# Patient Record
Sex: FEMALE
[De-identification: names, ages, dates, MRNs, and addresses within clinical notes are randomized per-mention and may not be internally consistent; named-entity substitution may affect disease eponyms.]

---

## 2017-10-01 NOTE — ED PDOC
Arrival/HPI





- General


Chief Complaint: Abdominal Pain


Time Seen by Provider: 10/01/17 08:47


Historian: Patient





- History of Present Illness


Narrative History of Present Illness (Text): 


10/01/17 08:57


A 56 year old female pmh enteritis, diabetes mellitus, hypertension, and 

depression, surgical history includes bladder sling, spinal disc repair, hernia 

dorsal spine repair, carpal tunnel repair, presents to the emergency department 

for constant lower abdominal "burning" discomfort, which began 3 days ago. She 

notes she has associated mild nausea. The patient reports she has not taken 

anything for the pain at home. She denies any fever, chest pain, vomiting, 

diarrhea, dysuria, vaginal discharge, vaginal bleeding, or any other complaints 

at this time. 











Time/Duration: < week (x 3 days)


Symptom Onset: Gradual


Symptom Course: Unchanged


Quality: Burning (lower abdominal )


Activities at Onset: Light


Context: Home





Past Medical History





- Provider Review


Nursing Documentation Reviewed: Yes





- Past History


Past History: Non-Contributing





- Infectious Disease


Hx of Infectious Diseases: None





- Tetanus Immunization


Tetanus Immunization: Unknown





- Reproductive


Menopause: Yes





- Cardiac


Hx Cardiac Disorders: Yes


Hx Hypertension: Yes





- Pulmonary


Hx Respiratory Disorders: No





- Neurological


Hx Neurological Disorder: No





- HEENT


Hx HEENT Disorder: No





- Renal


Hx Renal Disorder: No





- Endocrine/Metabolic


Hx Endocrine Disorders: Yes


Hx Diabetes Mellitus Type 2: Yes


Hx Hyperthyroidism: Yes





- Hematological/Oncological


Hx Blood Disorders: No





- Integumentary


Hx Dermatological Disorder: No





- Musculoskeletal/Rheumatological


Hx Musculoskeletal Disorders: No





- Gastrointestinal


Hx Gastrointestinal Disorders: No





- Genitourinary/Gynecological


Hx Genitourinary Disorders: No





- Psychiatric


Hx Psychophysiologic Disorder: Yes


Hx Depression: Yes


Hx Substance Use: No





- Past Surgical History


Past Surgical History: No Previous





- Surgical History


Hx Orthopedic Surgery: Yes (carpal tunnel, herniated disc)





- Anesthesia


Hx Anesthesia: Yes


Hx Anesthesia Reactions: No


Hx Malignant Hyperthermia: No





- Suicidal Assessment


Feels Threatened In Home Enviroment: No





Family/Social History





- Physician Review


Nursing Documentation Reviewed: Yes


Family/Social History: Unknown Family HX


Smoking Status: Never Smoked


Hx Alcohol Use: No


Hx Substance Use: No


Hx Substance Use Treatment: No





Allergies/Home Meds


Allergies/Adverse Reactions: 


Allergies





No Known Allergies Allergy (Verified 10/01/17 08:54)


 








Home Medications: 


 Home Meds











 Medication  Instructions  Recorded  Confirmed


 


ARIPiprazole [Abilify] 2 mg PO DAILY 10/01/17 10/01/17


 


Escitalopram [Lexapro] 10 mg PO DAILY 10/01/17 10/01/17


 


GlipiZIDE [Glipizide] 10 mg PO BID 10/01/17 10/01/17


 


Lisinopril [Zestril] 40 mg PO DAILY 10/01/17 10/01/17


 


MetFORMIN [glucOPHAGE] 1,000 mg PO BID 10/01/17 10/01/17


 


amLODIPine [Norvasc] 5 mg PO DAILY 10/01/17 10/01/17














Review of Systems





- Physician Review


All systems were reviewed & negative as marked: Yes





- Review of Systems


Constitutional: absent: Fevers


Respiratory: absent: SOB, Cough


Cardiovascular: absent: Chest Pain


Gastrointestinal: Nausea (mild), Other (lower abdominal burning ).  absent: 

Abdominal Pain, Diarrhea, Vomiting


Genitourinary Female: absent: Dysuria, Frequency, Hematuria, Vaginal Bleeding, 

Vaginal Discharge





Physical Exam


Vital Signs Reviewed: Yes


Vital Signs











  Temp Pulse Resp BP Pulse Ox


 


 10/01/17 12:00   87  18  159/71 H  99


 


 10/01/17 10:33   89  18  157/79 H  98


 


 10/01/17 08:51  98.6 F  70  16  164/90 H  99











Temperature: Afebrile


Blood Pressure: Hypertensive


Pulse: Regular


Respiratory Rate: Normal


Appearance: Positive for: Well-Appearing, Non-Toxic, Comfortable


Pain Distress: None


Mental Status: Positive for: Alert and Oriented X 3


Finger Stick Blood Glucose: 147





- Systems Exam


Head: Present: Atraumatic, Normocephalic


Pupils: Present: PERRL


Mouth: Present: Moist Mucous Membranes


Neck: Present: Normal Range of Motion


Respiratory/Chest: Present: Clear to Auscultation, Good Air Exchange.  No: 

Respiratory Distress, Accessory Muscle Use


Cardiovascular: Present: Regular Rate and Rhythm, Normal S1, S2.  No: Murmurs


Abdomen: Present: Tenderness (non-focal lower abdominal tenderness), Normal 

Bowel Sounds.  No: Distention, Peritoneal Signs, McBurney's Point Tender


Neurological: Present: GCS=15, Speech Normal


Skin: Present: Warm, Dry, Normal Color.  No: Rashes


Psychiatric: Present: Alert, Oriented x 3, Normal Insight, Normal Concentration





Medical Decision Making


ED Course and Treatment: 





The pt appears well, in no distress, benign abdominal exam. I disc w both pt 

and her  results and plan for f/u for pain and also for incidental CT 

findings. Pt v/u and agrees w plan. Will f/u w pcp and gyn this week.





Progress Notes:


EKG:


Ordered, reviewed, and independently interpreted the EKG.


Rate :  73 BPM


Rhythm : NSR


Interpretation : No axis, normal intervals. No acute ischemia. 








PROCEDURE:  CT Abdomen and Pelvis with contrast





HISTORY:


abdominal pain





COMPARISON:


None.





TECHNIQUE:


Contrast dose: 100 mL Omnipaque 350





Radiation dose:





Total exam DLP = 511.44 mGy-cm.





This CT exam was performed using one or more of the following dose reduction 

techniques: Automated exposure control, adjustment of the mA and/or kV 

according to patient size, and/or use of iterative reconstruction technique.





FINDINGS:





LOWER THORAX:


Unremarkable. 





LIVER:


Unremarkable. No gross lesion or ductal dilatation. 





GALLBLADDER AND BILE DUCTS:


Unremarkable. 





PANCREAS:


The pancreas is normal in size.  There is a fluid density mass with direct 

communication demonstrated with the pancreatic duct. This mass measures 

approximately 1.2 x 0.6 cm. This most likely represents an IPMN.  Followup with 

contrast-enhanced CT is advised.  There is no other pancreatic mass identified.

  There is no pancreatic ductal dilatation.





SPLEEN:


Unremarkable. 





ADRENALS:


Unremarkable. No mass. 





KIDNEYS AND URETERS:


Unremarkable. No hydronephrosis. No solid mass. 





VASCULATURE:


Unremarkable. No aortic aneurysm. 





BOWEL:


Unremarkable. No obstruction. No gross mural thickening. 





APPENDIX:


Normal appendix. 





PERITONEUM:


Unremarkable. No free fluid. No free air. 





LYMPH NODES:


Unremarkable. No enlarged lymph nodes. 





BLADDER:


Unremarkable. 





REPRODUCTIVE:


Postmenopausal uterus.  Several coarse calcifications likely reflecting 

degenerating calcified fibroids. 





BONES:


No acute fracture. 





OTHER FINDINGS:


None.





IMPRESSION:


No acute abnormality. Fluid density 12 mm pancreatic mass with direct 

communication with the pancreatic duct most likely represents an IPMN.  

Followup with contrast-enhanced CT is advised.














- Lab Interpretations


Microbiology Results: 


Microbiology Results





10/01/17 09:30   Urine   Urine Culture - Final


                            No Growth (<1,000 CFU/ML)








Lab Results: 








 10/01/17 08:55 





 10/01/17 08:55 





 Lab Results





10/01/17 09:30: Urine Color Yellow, Urine Appearance Clear, Urine pH 7.0, Ur 

Specific Gravity 1.015, Urine Protein Trace H, Urine Glucose (UA) Negative, 

Urine Ketones Negative, Urine Blood Trace-intact H, Urine Nitrate Negative, 

Urine Bilirubin Negative, Urine Urobilinogen 0.2, Ur Leukocyte Esterase Negative

, Urine RBC 0 - 2, Urine WBC 0 - 2, Ur Epithelial Cells 3 - 4, Urine Bacteria 

Few


10/01/17 08:55: Sodium 142, Potassium 4.2, Chloride 103, Carbon Dioxide 27, 

Anion Gap 16, BUN 12, Creatinine 0.6, Est GFR (African Amer) > 60, Est GFR (Non-

Af Amer) > 60, Random Glucose 130 H, Calcium 9.9, Total Bilirubin 1.1, AST 26, 

ALT 39, Alkaline Phosphatase 133 H, Total Protein 8.0, Albumin 4.7, Globulin 3.3

, Albumin/Globulin Ratio 1.4, Lipase 81


10/01/17 08:55: WBC 7.3, RBC 4.53, Hgb 13.4, Hct 39.9, MCV 88.1, MCH 29.6, MCHC 

33.6, RDW 13.1, Plt Count 222, MPV 9.0, Gran % 49.0 L, Lymph % (Auto) 43.4 H, 

Mono % (Auto) 5.7, Eos % (Auto) 1.8, Baso % (Auto) 0.1, Gran # 3.59, Lymph # 3.2

, Mono # 0.4, Eos # 0.1, Baso # 0.01











- RAD Interpretation


Radiology Orders: 








10/01/17 10:00


ABD & PELVIS IV CONTRAST ONLY [CT] Stat 














- Medication Orders


Current Medication Orders: 











Discontinued Medications





Ketorolac Tromethamine (Toradol)  10 mg IVP STAT STA


   Stop: 10/01/17 12:12


   Last Admin: 10/01/17 12:25  Dose: 10 mg





MAR Pain Assessment


 Document     10/01/17 12:25  [facility]  (Rec: 10/01/17 12:26  [id number]


     Pain Reassessment


      Is this a pain reassessment?               No


     Sleep


      Is patient sleeping during reassessment?   No


     Presence of Pain


      Presence of Pain                           Yes


     Pain Scale Used


      Pain Scale Used                            Numeric


IVP Administration


 Document     10/01/17 12:25  [facility]  (Rec: 10/01/17 12:26  [id number]


     Charges for Administration


      # of IVP Administrations                   1














- Scribe Statement


The provider has reviewed the documentation as recorded by the Scribe


Rosibel Zhou





Provider Scribe Attestation:


All medical record entries made by the Scribe were at my direction and 

personally dictated by me. I have reviewed the chart and agree that the record 

accurately reflects my personal performance of the history, physical exam, 

medical decision making, and the department course for this patient. I have 

also personally directed, reviewed, and agree with the discharge instructions 

and disposition.








Disposition/Present on Arrival





- Present on Arrival


Any Indicators Present on Arrival: No


History of DVT/PE: No


History of Uncontrolled Diabetes: No


Urinary Catheter: No


History of Decub. Ulcer: No


History Surgical Site Infection Following: None





- Disposition


Have Diagnosis and Disposition been Completed?: Yes


Diagnosis: 


 Lower abdominal pain





Disposition: HOME/ ROUTINE


Disposition Time: 13:08


Condition: STABLE


Discharge Instructions (ExitCare):  Acute Abdominal Pain (ED)


Print Language: Ukrainian


Additional Instructions: 


Please follow up with your primary doctor and also with your gynecologist this 

week. Please take the naproxen for pain first and only take the tylenol with 

codeine if this is not helping. Return to the ER for any worsening symptoms or 

for any other concerns. 


Prescriptions: 


Acetaminophen with Codeine [Tylenol with Codeine #3 Tablet] 1 each PO Q4H PRN #

6 tablet


 PRN Reason: Pain, Moderate (4-7)


Naproxen [Naprosyn] 500 mg PO Q12H PRN #10 tablet


 PRN Reason: Pain, Moderate (4-7)


Ondansetron ODT [Zofran ODT] 4 mg PO Q4H PRN #10 odt


 PRN Reason: Nausea/Vomiting


Referrals: 


Ariel Weiner MD [Primary Care Provider] - Follow up with primary


Forms:  [email or web address] (Lithuanian)

## 2017-10-01 NOTE — CT
PROCEDURE:  CT Abdomen and Pelvis with contrast



HISTORY:

abdominal pain



COMPARISON:

None.



TECHNIQUE:

Contrast dose: 100 mL Omnipaque 350



Radiation dose:



Total exam DLP = 511.44 mGy-cm.



This CT exam was performed using one or more of the following dose 

reduction techniques: Automated exposure control, adjustment of the 

mA and/or kV according to patient size, and/or use of iterative 

reconstruction technique.



FINDINGS:



LOWER THORAX:

Unremarkable. 



LIVER:

Unremarkable. No gross lesion or ductal dilatation. 



GALLBLADDER AND BILE DUCTS:

Unremarkable. 



PANCREAS:

The pancreas is normal in size.  There is a fluid density mass with 

direct communication demonstrated with the pancreatic duct. This mass 

measures approximately 1.2 x 0.6 cm. This most likely represents an 

IPMN.  Followup with contrast-enhanced CT is advised.  There is no 

other pancreatic mass identified.  There is no pancreatic ductal 

dilatation.



SPLEEN:

Unremarkable. 



ADRENALS:

Unremarkable. No mass. 



KIDNEYS AND URETERS:

Unremarkable. No hydronephrosis. No solid mass. 



VASCULATURE:

Unremarkable. No aortic aneurysm. 



BOWEL:

Unremarkable. No obstruction. No gross mural thickening. 



APPENDIX:

Normal appendix. 



PERITONEUM:

Unremarkable. No free fluid. No free air. 



LYMPH NODES:

Unremarkable. No enlarged lymph nodes. 



BLADDER:

Unremarkable. 



REPRODUCTIVE:

Postmenopausal uterus.  Several coarse calcifications likely 

reflecting degenerating calcified fibroids. 



BONES:

No acute fracture. 



OTHER FINDINGS:

None.



IMPRESSION:

No acute abnormality. Fluid density 12 mm pancreatic mass with direct 

communication with the pancreatic duct most likely represents an 

IPMN.  Followup with contrast-enhanced CT is advised.

## 2017-10-02 NOTE — CARD
--------------- APPROVED REPORT --------------





EKG Measurement

Heart [id number]

HI [id number]

[id number]

[id number]

[id number]



<Conclusion>

Normal sinus rhythm

Normal ECG

No change

## 2017-10-19 NOTE — CARD
--------------- APPROVED REPORT --------------





EKG Measurement

Heart [id number]

SD [id number]

[id number]

[id number]

[id number]



<Conclusion>

Normal sinus rhythm

Normal ECG

## 2017-10-19 NOTE — RAD
HISTORY:

abdominal pain  



COMPARISON:

Portable chest 07/02/2015. 



FINDINGS:



LUNGS:

No active pulmonary disease.



PLEURA:

No significant pleural effusion identified, no pneumothorax apparent.



CARDIOVASCULAR:

Normal.



OSSEOUS STRUCTURES:

No significant abnormalities.



VISUALIZED UPPER ABDOMEN:

Normal.



OTHER FINDINGS:

None.



IMPRESSION:

No interval acute cardiopulmonary disease appreciated.

## 2017-10-19 NOTE — CT
EXAM:

  CT Abdomen and Pelvis With Intravenous Contrast



EXAM DATE/TIME:

  10/19/2017 3:09 PM



CLINICAL HISTORY:

  57 years old, female; Pain; Abdominal pain; Generalized; Prior surgery; 

Surgery date: 6+ months; Surgery type: Bladder surgery (lifted)



TECHNIQUE:

  Axial computed tomography images of the abdomen and pelvis with intravenous 

contrast.  All CT scans at this facility use one or more dose reduction 

techniques, viz.: automated exposure control; ma/kV adjustment per patient size 

(including targeted exams where dose is matched to indication; i.e. head); or 

iterative reconstruction technique.

  Coronal and sagittal reformatted images were created and reviewed.



CONTRAST:

  100 mL of OMNI 350 administered intravenously.



COMPARISON:

  CT - ABD   PELVIS IV CONTRAST ONLY 2017-10-01 10:37



FINDINGS:

  Lower thorax:  Heart size is normal. There is a small hiatal hernia. There is 

minimal atelectasis and scarring at the lung bases.



 ABDOMEN:

  Liver:  There is fatty infiltration of the liver.

  Gallbladder and bile ducts:  unremarkable

  Pancreas:  Pancreas is mildly atrophic. There is a 7 x 6 millimeter low 

attenuation lesion at the junction of the pancreatic body and tail, unchanged 

compared to the prior study. Pancreas is otherwise unremarkable.

  Spleen:  unremarkable

  Adrenals:  unremarkable

  Kidneys and ureters:  unremarkable

  Stomach and bowel:  Stomach is empty. Rotation is normal. The proximal 

jejunal loops are mildly distended with air-fluid levels. There is no small 

bowel obstruction. There is oral contrast in the distal small bowel. Terminal 

ileum is unremarkable. Appendix is unremarkable. There is moderate stool in the 

colon.

  Appendix:  See stomach and bowel



 PELVIS:

  Bladder:  unremarkable

  Reproductive:  Uterine contours are lobular. There are small calcified 

masses/fibroids.  Adnexa are unremarkable.



 ABDOMEN and PELVIS:

  Intraperitoneal space:  There is no free air or free fluid.

  Bones/joints:  There are degenerative changes in the osseus structures.

  Soft tissues:  is a fat-containing umbilical hernia.

  Vasculature:  There are atherosclerotic calcifications in the aorta.

  Lymph nodes:  There is no pathologic adenopathy.



 



IMPRESSION: Fatty liver, no acute solid visceral abnormality; low 

attenuation/cystic pancreatic body lesion unchanged since the prior study; no 

bowel obstruction; no CT findings of appendicitis; fibroid uterus



Additional findings as described above.

## 2017-10-19 NOTE — ED PDOC
Arrival/HPI





<Janine Sanchez - Last Filed: 10/19/17 22:33>





- General


Historian: Patient





- History of Present Illness


Time/Duration: Other (approx 20 days)





<Eva Escamilla - Last Filed: 10/20/17 00:34>





- General


Chief Complaint: Abdominal Pain


Time Seen by Provider: 10/19/17 14:37





- History of Present Illness


Narrative History of Present Illness (Text): 





10/19/17 15:13


57yr old female presents today with continued worsening diffuse abdominal pain, 

greatest in the RLQ. c/o subjective fevers and chills. no n/v/d/c. no cp or 

sob. c/o diffuse bodyaches. pt states she has been taking tylenol for pain 

without improvement. denies urinary symptoms.  no dizziness or weakness. no 

other complaints.  (Eva Escamilla)





Past Medical History





- Provider Review


Nursing Documentation Reviewed: Yes





- Travel History


Have you recently traveled outside [place] w/in the past 3 mons?: No





- Past History


Past History: Non-Contributing





- Infectious Disease


Hx of Infectious Diseases: None





- Tetanus Immunization


Tetanus Immunization: Unknown





- Cardiac


Hx Cardiac Disorders: Yes


Hx Hypertension: Yes





- Pulmonary


Hx Respiratory Disorders: No





- Neurological


Hx Neurological Disorder: No





- HEENT


Hx HEENT Disorder: No





- Renal


Hx Renal Disorder: No





- Endocrine/Metabolic


Hx Endocrine Disorders: Yes


Hx Diabetes Mellitus Type 2: Yes


Hx Hyperthyroidism: Yes





- Hematological/Oncological


Hx Blood Disorders: No





- Integumentary


Hx Dermatological Disorder: No





- Musculoskeletal/Rheumatological


Hx Musculoskeletal Disorders: No





- Gastrointestinal


Hx Gastrointestinal Disorders: No





- Genitourinary/Gynecological


Hx Genitourinary Disorders: No





- Psychiatric


Hx Psychophysiologic Disorder: Yes


Hx Depression: Yes


Hx Substance Use: No





- Past Surgical History


Past Surgical History: No Previous





- Surgical History


Hx Orthopedic Surgery: Yes (carpal tunnel, herniated disc)





- Anesthesia


Hx Anesthesia: Yes


Hx Anesthesia Reactions: No


Hx Malignant Hyperthermia: No





- Suicidal Assessment


Feels Threatened In Home Enviroment: No





<Eva Escamilla - Last Filed: 10/20/17 00:34>





Family/Social History





- Physician Review


Nursing Documentation Reviewed: Yes


Family/Social History: Unknown Family HX


Smoking Status: Never Smoked


Hx Alcohol Use: No


Hx Substance Use: No


Hx Substance Use Treatment: No





<Eva Escamilla - Last Filed: 10/20/17 00:34>





Allergies/Home Meds





<Janine Sanchez - Last Filed: 10/19/17 22:33>





<Eva Escamilla - Last Filed: 10/20/17 00:34>


Allergies/Adverse Reactions: 


Allergies





No Known Allergies Allergy (Verified 10/19/17 14:47)


 








Home Medications: 


 Home Meds











 Medication  Instructions  Recorded  Confirmed


 


ARIPiprazole [Abilify] 2 mg PO DAILY 10/01/17 10/01/17


 


Escitalopram [Lexapro] 10 mg PO DAILY 10/01/17 10/01/17


 


GlipiZIDE [Glipizide] 10 mg PO BID 10/01/17 10/01/17


 


Lisinopril [Zestril] 40 mg PO DAILY 10/01/17 10/01/17


 


MetFORMIN [glucOPHAGE] 1,000 mg PO BID 10/01/17 10/01/17


 


amLODIPine [Norvasc] 5 mg PO DAILY 10/01/17 10/01/17














Review of Systems





- Review of Systems


Constitutional: Fevers.  absent: Fatigue


Respiratory: absent: SOB, Cough


Cardiovascular: absent: Chest Pain, Palpitations


Gastrointestinal: Abdominal Pain.  absent: Constipation, Diarrhea, Nausea, 

Vomiting


Genitourinary Female: absent: Dysuria, Frequency, Hematuria


Musculoskeletal: Other (bodyaches).  absent: Arthralgias, Back Pain, Neck Pain


Skin: absent: Rash, Pruritis


Neurological: absent: Headache, Dizziness


Psychiatric: absent: Anxiety, Depression





<Eva Escamilla - Last Filed: 10/20/17 00:34>





Physical Exam


Vital Signs Reviewed: Yes


Temperature: Afebrile


Blood Pressure: Hypertensive


Pulse: Regular


Respiratory Rate: Normal


Appearance: Positive for: Well-Appearing, Non-Toxic, Uncomfortable


Pain Distress: None


Mental Status: Positive for: Alert and Oriented X 3





- Systems Exam


Head: Present: Atraumatic


Mouth: Present: Moist Mucous Membranes


Neck: Present: Normal Range of Motion


Respiratory/Chest: Present: Clear to Auscultation, Good Air Exchange.  No: 

Respiratory Distress, Accessory Muscle Use


Cardiovascular: Present: Regular Rate and Rhythm


Abdomen: Present: Normal Bowel Sounds, Guarding (voluntary).  No: Tenderness (

diffuse abdominal tenderness), Distention, Peritoneal Signs, Rebound


Genitourinary/Pelvic Exam: Present: Normal External Genitalia


Back: Present: Normal Inspection.  No: CVA Tenderness, Midline Tenderness, 

Paraspinal Tenderness


Upper Extremity: Present: Normal ROM


Lower Extremity: Present: Normal ROM


Neurological: Present: GCS=15, Speech Normal


Skin: Present: Warm, Dry, Normal Color.  No: Rashes


Psychiatric: Present: Alert, Oriented x 3





<Eva Escamilla - Last Filed: 10/20/17 00:34>


Vital Signs











  Temp Pulse Resp BP Pulse Ox


 


 10/19/17 16:32   72  18  152/90 H  100


 


 10/19/17 14:43  98.8 F  72  17  164/88 H  100














Medical Decision Making





<Janine Sanchez - Last Filed: 10/19/17 22:33>





<Eva Escamilla - Last Filed: 10/20/17 00:34>


ED Course and Treatment: 





10/19/17 15:16


57yr old female presents today with diffuse abdominal pain. had ct of 

pancreatic mass on 10/1. now with continued pain. has outpatient ct scheduled 

for next week. 





morphine given for pain


NS iv bolus given





cbc wnl


cmp wnl


lipase wnl


UA;trace blood





10/19/17 16:23


pt reassessment; pt feeling better after medications; vitals stable. 





pt reassessment; pain is returning. pt with luq, rlq tenderness. 





CT abd/pelvis with PO and IV contrast:FINDINGS:


Lower thorax: Heart size is normal. There is a small hiatal hernia. There is 

minimal atelectasis and


scarring at the lung bases.


ABDOMEN:


Liver: There is fatty infiltration of the liver.


Gallbladder and bile ducts: unremarkable


Pancreas: Pancreas is mildly atrophic. There is a 7 x 6 millimeter low 

attenuation lesion at the


junction of the pancreatic body and tail, unchanged compared to the prior 

study. Pancreas is


otherwise unremarkable.


Spleen: unremarkable


Adrenals: unremarkable


Kidneys and ureters: unremarkable


Stomach and bowel: Stomach is empty. Rotation is normal. The proximal jejunal 

loops are mildly


distended with air-fluid levels. There is no small bowel obstruction. There is 

oral contrast in the distal


small bowel. Terminal ileum is unremarkable. Appendix is unremarkable. There is 

moderate stool in


the colon.


Appendix: See stomach and bowel


PELVIS:


Bladder: unremarkable


Reproductive: Uterine contours are lobular. There are small calcified masses/

fibroids. Adnexa are


unremarkable.


ABDOMEN and PELVIS:


Intraperitoneal space: There is no free air or free fluid.


Bones/joints: There are degenerative changes in the osseus structures.


Soft tissues: is a fat-containing umbilical hernia.


Vasculature: There are atherosclerotic calcifications in the aorta.


Lymph nodes: There is no pathologic adenopathy.


IMPRESSION: Fatty liver, no acute solid visceral abnormality; low attenuation/

cystic pancreatic body


lesion unchanged since the prior study; no bowel obstruction; no CT findings of 

appendicitis; fibroid


uterus


Additional findings as described above.











case discussed with dr. chavez accepts admission to med/surg 





all aspects of this case were discussed the attending of record. 





impression; intractable abdominal pain, pancreatic mass


admit to med/surg


 (Eva Escamilla)





- Lab Interpretations


Lab Results: 








 10/19/17 14:40 





 10/19/17 14:40 





 Lab Results





10/19/17 18:45: Urine Color Yellow, Urine Appearance Clear, Urine pH 7.0, Ur 

Specific Gravity 1.010, Urine Protein Negative, Urine Glucose (UA) Negative, 

Urine Ketones Negative, Urine Blood Trace-intact H, Urine Nitrate Negative, 

Urine Bilirubin Negative, Urine Urobilinogen 0.2, Ur Leukocyte Esterase Negative

, Urine RBC 0 - 2, Urine WBC 0 - 2, Ur Epithelial Cells 0 - 2, Urine Bacteria 

Rare


10/19/17 14:40: WBC 7.4, RBC 4.41, Hgb 13.2, Hct 39.0, MCV 88.4, MCH 29.9, MCHC 

33.8, RDW 12.9, Plt Count 224, MPV 9.0, Gran % 45.8 L, Lymph % (Auto) 46.2 H, 

Mono % (Auto) 6.1 H, Eos % (Auto) 1.8, Baso % (Auto) 0.1, Gran # 3.40, Lymph # 

3.4, Mono # 0.5, Eos # 0.1, Baso # 0.01


10/19/17 14:40: Sodium 144, Potassium 4.3, Chloride 104, Carbon Dioxide 30, 

Anion Gap 14, BUN 14, Creatinine 0.6 L, Est GFR ( Amer) > 60, Est GFR (

Non-Af Amer) > 60, Random Glucose 106, Calcium 9.9, Total Bilirubin 0.9, AST 24

, ALT 38, Alkaline Phosphatase 125, Total Protein 7.9, Albumin 4.7, Globulin 3.2

, Albumin/Globulin Ratio 1.5, Lipase 34











- RAD Interpretation


Radiology Orders: 








10/19/17 14:47


CHEST PORTABLE [RAD] Stat 





10/19/17 15:09


ABD PELVIS PO & IV CONTRAST [CT] Stat 














- Medication Orders


Current Medication Orders: 











Discontinued Medications





Sodium Chloride (Sodium Chloride 0.9%)  1,000 mls @ 999 mls/hr IV .Q1H1M STA


   Stop: 10/19/17 15:47


   Last Admin: 10/19/17 15:52  Dose: 999 mls/hr





eMAR Start Stop


 Document     10/19/17 15:52  MB  (Rec: 10/19/17 15:53  [id number]


     Intravenous Solution


      Start Date                                 10/19/17


      Start Time                                 15:53





Morphine Sulfate (Morphine)  4 mg IVP STAT STA


   Stop: 10/19/17 14:48


   Last Admin: 10/19/17 15:53  Dose: 4 mg





MAR Pain Assessment


 Document     10/19/17 15:53  MB  (Rec: 10/19/17 15:54  [id number]


     Pain Reassessment


      Is this a pain reassessment?               No


     Sleep


      Is patient sleeping during reassessment?   No


     Presence of Pain


      Presence of Pain                           Yes


     Pain Scale Used


      Pain Scale Used                            Numeric


     Location


      Left, Right or Bilateral                   Bilateral


      Upper or Lower                             Lower


      Pain Location Body Site                    Abdomen


     Description


      Description                                Constant


      Intensity of Pain at present               8


      Acceptable Level of Pain                   0


      Pain Behavior                              Guarding


                                                 Refusal to Eat


      Aggravating Factors                        Changing Position


      Alleviating Factors/Management             Medication


       Techniques                                


IVP Administration


 Document     10/19/17 15:53  MB  (Rec: 10/19/17 15:54  [id number]


     Charges for Administration


      # of IVP Administrations                   1














Disposition/Present on Arrival





- Disposition


Have Diagnosis and Disposition been Completed?: Yes





<Janine Sanchez - Last Filed: 10/19/17 22:33>





- Present on Arrival


Any Indicators Present on Arrival: No


History of DVT/PE: No


History of Uncontrolled Diabetes: No


Urinary Catheter: No


History of Decub. Ulcer: No


History Surgical Site Infection Following: None





- Disposition


Have Diagnosis and Disposition been Completed?: Yes


Disposition Time: 22:00


Patient Plan: Admission





<Eva Escamilla - Last Filed: 10/20/17 00:34>





- Disposition


Diagnosis: 


 Abdominal pain, Mass of pancreas





Disposition: HOSPITALIZED


Patient Problems: 


 Current Active Problems











Problem Status Onset


 


Abdominal pain Acute  


 


Mass of pancreas Acute  











Condition: FAIR

## 2017-10-20 NOTE — RAD
PROCEDURE:  Radiographs of the Lumbar Spine.



HISTORY:

pain







COMPARISON:

No prior.



FINDINGS:



BONES:

Evaluation somewhat limited due to oral contrast from CT examination 

within colon, projecting over the spine.



No fracture. Normal alignment maintained.



DISC SPACES:

Unremarkable.



OTHER FINDINGS:

None.



IMPRESSION:

Unremarkable radiographs of the lumbar spine.

## 2017-10-20 NOTE — US
HISTORY:

low abd pain



COMPARISON:

None available.



TECHNIQUE:

Transabdominal



FINDINGS:



UTERUS:

Measures 7.8 x 2.9 x 5.0 cm. Partially calcified anterior fundal 

fibroid, 1.1 x 1.4 x 1.4 cm. There is nodular calcification in the 

posterior uterine body. Likely this reflects a degenerated calcified 

fibroid.



ENDOMETRIUM:

Measures to mm in diameter. Unremarkable. 



CERVIX:

No cervical abnormality identified.



RIGHT OVARY:

Measures 1.2 x 1.9 x 2.3 cm. No solid mass. Normal flow. 



LEFT OVARY:

Not visualize 



FREE FLUID:

No significant free fluid noted.



OTHER FINDINGS:

None. 



IMPRESSION:

Several small calcified fibroids. No additional abnormality.  Left 

ovary not visualized.

## 2017-10-20 NOTE — CP.PCM.CON
History of Present Illness





- History of Present Illness


History of Present Illness: 


Heme/Onc Consult Note for Dr. Caceres's service





cc: abdominal pain


HPI: Patient is a 56yo female with past medical history of hypertension, 

diabetes mellitus type 2, hypothyriodism and depression that presented to 

[facility] c/o abdominal pain. Per patient's daughter, she had been 

experiencing non-radiating suprapubic abdominal pain for the past 2 weeks. She 

described the pain as burning in nature, intermittent and a 6 out of 10 in 

severity. She denied any alleviating or exacerbating symptoms and reported that 

prior to this she had been in her usual state of health with no acute issues. 

Patient's daughter also noted that her mother had lost approximately 4 pounds 

in the last month and has had a poor appetite as of late. Of note, on 10/1 

patient was seen in the ED and had a CT abdomen/pelvis which revealed a fluid 

density 12mm pancreatic mass with direct communication with the pancreatic duct 

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 

Presently, she denies chest pain, palpitations, SOB, nausea, vomiting, focal 

weakness, numbness, tingling, hematochezia, melena, hemoptysis, hematemesis. 

Heme/Onc consulted for evaluation of pancreatic mass. 





12point ROS as per HPI above otherwise negative


PMH: as stated above


PSH: bladder suspension by Dr. Milo Taylor, spinal repair, carpal tunnel 

repair, hernia dorsal spine repair


Allergies: NKDA


Family Hx: Ovarian cancer in her great grandmother


Social Hx: Denied tobacco, alcohol and illicit drug use


Medications: Reviewed and as per chart





Past Patient History





- Infectious Disease


Hx of Infectious Diseases: None





- Tetanus Immunizations


Tetanus Immunization: Unknown





- Past Social History


Smoking Status: Never Smoked





- CARDIAC


Hx Hypertension: Yes





- PULMONARY


Hx Respiratory Disorders: No





- NEUROLOGICAL


Hx Neurological Disorder: No





- HEENT


Hx HEENT Problems: No





- RENAL


Hx Chronic Kidney Disease: No





- ENDOCRINE/METABOLIC


Hx Diabetes Mellitus Type 2: Yes





- HEMATOLOGICAL/ONCOLOGICAL


Hx Blood Disorders: No





- INTEGUMENTARY


Hx Dermatological Problems: No





- MUSCULOSKELETAL/RHEUMATOLOGICAL


Hx Falls: No





- GASTROINTESTINAL


Hx Gastrointestinal Disorders: No





- GENITOURINARY/GYNECOLOGICAL


Hx Genitourinary Disorders: No





- PSYCHIATRIC


Hx Depression: Yes





- SURGICAL HISTORY


Hx Orthopedic Surgery: Yes (carpal tunnel, herniated disc)





- ANESTHESIA


Hx Anesthesia: Yes


Hx Anesthesia Reactions: No


Hx Malignant Hyperthermia: No





Meds


Allergies/Adverse Reactions: 


 Allergies











Allergy/AdvReac Type Severity Reaction Status Date / Time


 


No Known Allergies Allergy   Verified 10/19/17 14:47














- Medications


Medications: 


 Current Medications





Amlodipine Besylate (Norvasc)  5 mg PO DAILY [facility]


   Last Admin: 10/20/17 09:45 Dose:  5 mg


Escitalopram Oxalate (Lexapro)  10 mg PO DAILY [facility]


   Last Admin: 10/20/17 09:45 Dose:  10 mg


Glipizide (Glucotrol)  10 mg PO BID [facility]


   Last Admin: 10/20/17 09:45 Dose:  10 mg


Insulin Human Regular (Humulin R Low)  0 units SC ACHS [facility]


   PRN Reason: Protocol


Lisinopril (Zestril)  40 mg PO DAILY [facility]


   Last Admin: 10/20/17 09:45 Dose:  40 mg


Morphine Sulfate (Morphine)  4 mg IVP Q4H PRN


   PRN Reason: Pain, severe (8-10)


Aripiprazole [ Abilify] 2 Mg  (Home Med)  2 mg PO DAILY [facility]











Physical Exam





- Constitutional


Appears: No Acute Distress





- Head Exam


Head Exam: ATRAUMATIC, NORMAL INSPECTION, NORMOCEPHALIC





- Eye Exam


Eye Exam: EOMI


Pupil Exam: PERRL





- ENT Exam


ENT Exam: Mucous Membranes Moist





- Neck Exam


Neck exam: Positive for: Normal Inspection.  Negative for: Lymphadenopathy, 

Tenderness, Thyromegaly





- Respiratory Exam


Respiratory Exam: Clear to Auscultation Bilateral, NORMAL BREATHING PATTERN.  

absent: Rales, Rhonchi, Wheezes





- Cardiovascular Exam


Cardiovascular Exam: RRR, +S1, +S2.  absent: Clicks, Gallop, JVD, Rubs, 

Systolic Murmur





- GI/Abdominal Exam


GI & Abdominal Exam: Soft, Tenderness (suprapubic).  absent: Distended, Firm, 

Guarding, Rebound, Rigid





- Extremities Exam


Extremities exam: Positive for: normal inspection, pedal pulses present.  

Negative for: calf tenderness, pedal edema, tenderness





- Back Exam


Back exam: NORMAL INSPECTION





- Neurological Exam


Neurological exam: Alert, Oriented x3





- Psychiatric Exam


Psychiatric exam: Normal Affect, Normal Mood





- Skin


Skin Exam: Dry, Intact, Normal Color, Warm





Results





- Vital Signs


Recent Vital Signs: 


 Last Vital Signs











Temp  98.1 F   10/20/17 07:54


 


Pulse  62   10/20/17 07:54


 


Resp  16   10/20/17 07:54


 


BP  151/79 H  10/20/17 07:54


 


Pulse Ox  96   10/20/17 07:54














- Labs


Result Diagrams: 


 10/19/17 14:40





 10/19/17 14:40


Labs: 


 Laboratory Results - last 24 hr











  10/20/17 10/20/17 10/20/17





  07:46 12:05 16:23


 


POC Glucose (mg/dL)  118 H  93  88














Assessment & Plan





- Assessment and Plan (Free Text)


Plan: 


56yo female with history of hypertension, depression, diabetes mellitus type 2 

presents c/o suprapubic abdominal pain for the past 2 weeks; oncology consulted 

for evaluation of pancreatic mass





1. Pancreatic mass


2. Abdominal pain


3. Hypertension


4. Diabetes mellitus type 2


5. Depression





-Abdominal pain is suprapubic in nature and unlikely related to the pancreatic 

mass found on imaging given the location of her pain in relation to the mass; 

MRI of the pelvis has been ordered for further evaluation of the abdominal pain


-Patient will require a pancreatic protocol CT or MRCP for evaluation of the 

pancreatic mass found on CT. An MRCP has been ordered. She may benefit from EUS 

with fine needle biopsy depending on the findings on MRCP


-CT abdomen/pelvis from 10/1/2017 reviewed; revealed a fluid density 12mm 

pancreatic mass with direct communication with the pancreatic duct most likely 

representing an intraductal papillary mucinous neoplasm (IPMN)


-CT abdomen/pelvis from 10/19/2017 reviewed; revealed fatty liver with no acute 

solid visceral abnormality; low attenuation/cystic pancreatic body lesion 

unchanged since the prior study


-Pelvic ultrasound reviewed; revealed several small calcified fibroids





Patient seen and case discussed with attending, Dr. Caceres





- Date & Time


Date: 10/20/17


Time: 17:59

## 2017-10-20 NOTE — CP.PCM.CON
<Brea Hughes - Last Filed: 10/20/17 16:11>





History of Present Illness





- History of Present Illness


History of Present Illness: 








Seen and examined at the bedside earlier this morning, the chart was reviewed.





Request for GI consult is for abnormal CT scan, pancreatic cysts.





HPI: This is a 57 year old female with no pertinent past medical history came 

to the emergency room with worsening diffuse abdominal pain.  The patient is 

[place]ic-speaking and her daughter is currently at the bedside.  The patient 

complains of pain mostly to right lower quadrant and diffuse pain in the supine 

lower pelvis area.  Reports that the pain has been ongoing for 2 weeks 

intermittently and has been worsening, describes it as a burning and heaviness 

sensation.  She does complain of back pain, denies dysuria.  The patient was in 

the emergency room on October 1, had a CT scan of abdomen and pelviswith IV 

contrast reporting 12 mm fluid density pancreatic mass with direct 

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  The patient 

returned to the hospital she had another CAT scan on this admission with IV and 

oral contrast and this reported low attenuation/cystic pancreatic body lesion 

unchanged since 2 prior study, no bowel obstruction or other acute finding.  

Patient also complains of abdominal bloating at any time, no reports of any 

vaginal discharge or bleeding.  Patient denies ever having a colonoscopy, 

although she did have an upper endoscopy about 7 or 8 years ago and does not 

recall any ulcers or other acute findings. patient denies change in bowel habits

, moving his bowels regularly last BM was yesterday.  No melena or bright red 

blood per rectum.





Past medical history: diabetes mellitus, hypertension


Surgical history: Neck surgery, back surgery, vaginal prolapse surgery


Social history: Denies smoking, EtOH or drugs


Family history: Grandmother on maternal side with ovarian cancer


Allergies: No known drug allergies


Medications: Reviewed as per MAR


ROS: Systems reviewed with positive findings see HPI.








Past Patient History





- Infectious Disease


Hx of Infectious Diseases: None





- Tetanus Immunizations


Tetanus Immunization: Unknown





- Past Social History


Smoking Status: Never Smoked





- CARDIAC


Hx Hypertension: Yes





- PULMONARY


Hx Respiratory Disorders: No





- NEUROLOGICAL


Hx Neurological Disorder: No





- HEENT


Hx HEENT Problems: No





- RENAL


Hx Chronic Kidney Disease: No





- ENDOCRINE/METABOLIC


Hx Diabetes Mellitus Type 2: Yes





- HEMATOLOGICAL/ONCOLOGICAL


Hx Blood Disorders: No





- INTEGUMENTARY


Hx Dermatological Problems: No





- MUSCULOSKELETAL/RHEUMATOLOGICAL


Hx Falls: No





- GASTROINTESTINAL


Hx Gastrointestinal Disorders: No





- GENITOURINARY/GYNECOLOGICAL


Hx Genitourinary Disorders: No





- PSYCHIATRIC


Hx Depression: Yes





- SURGICAL HISTORY


Hx Orthopedic Surgery: Yes (carpal tunnel, herniated disc)





- ANESTHESIA


Hx Anesthesia: Yes


Hx Anesthesia Reactions: No


Hx Malignant Hyperthermia: No





Meds


Allergies/Adverse Reactions: 


 Allergies











Allergy/AdvReac Type Severity Reaction Status Date / Time


 


No Known Allergies Allergy   Verified 10/19/17 14:47














- Medications


Medications: 


 Current Medications





Amlodipine Besylate (Norvasc)  5 mg PO DAILY [facility]


   Last Admin: 10/20/17 09:45 Dose:  5 mg


Escitalopram Oxalate (Lexapro)  10 mg PO DAILY [facility]


   Last Admin: 10/20/17 09:45 Dose:  10 mg


Glipizide (Glucotrol)  10 mg PO BID [facility]


   Last Admin: 10/20/17 09:45 Dose:  10 mg


Insulin Human Regular (Humulin R Low)  0 units SC ACHS [facility]


   PRN Reason: Protocol


Lisinopril (Zestril)  40 mg PO DAILY [facility]


   Last Admin: 10/20/17 09:45 Dose:  40 mg


Morphine Sulfate (Morphine)  4 mg IVP Q4H PRN


   PRN Reason: Pain, severe (8-10)


Aripiprazole [ Abilify] 2 Mg  (Home Med)  2 mg PO DAILY [facility]











Physical Exam





- Constitutional


Appears: No Acute Distress





- Head Exam


Head Exam: NORMOCEPHALIC





- Eye Exam


Eye Exam: Normal appearance.  absent: Scleral icterus





- ENT Exam


ENT Exam: Mucous Membranes Moist (.)





- Neck Exam


Neck exam: Positive for: Normal Inspection





- Respiratory Exam


Respiratory Exam: Clear to Auscultation Bilateral, NORMAL BREATHING PATTERN.  

absent: Respiratory Distress





- Cardiovascular Exam


Cardiovascular Exam: +S1, +S2





- GI/Abdominal Exam


GI & Abdominal Exam: Distended, Normal Bowel Sounds, Soft, Tenderness (right 

lower quaderant, difuse tenderness to lower abdomen/pelvis, and lower back 

tenderness).  absent: Guarding, Organomegaly, Rebound





- Extremities Exam


Extremities exam: Negative for: pedal edema, pedal pulses present





- Neurological Exam


Neurological exam: Alert, CN II-XII Intact, Oriented x3





- Skin


Skin Exam: Dry, Warm





Results





- Vital Signs


Recent Vital Signs: 


 Last Vital Signs











Temp  98.1 F   10/20/17 07:54


 


Pulse  62   10/20/17 07:54


 


Resp  16   10/20/17 07:54


 


BP  151/79 H  10/20/17 07:54


 


Pulse Ox  96   10/20/17 07:54














- Labs


Result Diagrams: 


 10/19/17 14:40





 10/19/17 14:40


Labs: 


 Laboratory Results - last 24 hr











  10/20/17





  07:46


 


POC Glucose (mg/dL)  118 H














Assessment & Plan





- Assessment and Plan (Free Text)


Assessment: 





Assessment:





Abdominal pain:right lower quadrant and diffuse to lower abdomen/pelvis


Abnormal CT scan, report 7.6 mm pancreatic lesion in the body and tail, rule 

out pancreatic mass/I PMN


History of diabetes mellitus


Hypertension


Rule out UTI


history of vaginal prolapse





Plan:





Diet as tolerated


Request her pelvic ultrasound


urine culture


patient with benefit from an MRI/MRCP with and without contrast, patient 

received IV contrast CT scan yesterday, we will consider this test for the 

morning, we'll repeat the BMP in the a.m.  Patient may also benefit from EUS, 

will make further recommendation after review of MRI.


oncology evaluation





Thank you for this consult and for allowing us to participate in your patient's 

care.





Seen and discussed with Dr. Peñaloza





<Marry Peñaloza - Last Filed: 10/22/17 23:07>





Meds





- Medications


Medications: 


 Current Medications





Amlodipine Besylate (Norvasc)  5 mg PO DAILY [facility]


   Last Admin: 10/20/17 09:45 Dose:  5 mg


Escitalopram Oxalate (Lexapro)  10 mg PO DAILY [facility]


   Last Admin: 10/20/17 09:45 Dose:  10 mg


Glipizide (Glucotrol)  10 mg PO BID [facility]


   Last Admin: 10/20/17 09:45 Dose:  10 mg


Insulin Human Regular (Humulin R Low)  0 units SC ACHS [facility]


   PRN Reason: Protocol


   Last Admin: 10/20/17 21:31 Dose:  Not Given


Lisinopril (Zestril)  40 mg PO DAILY [facility]


   Last Admin: 10/20/17 09:45 Dose:  40 mg


Morphine Sulfate (Morphine)  4 mg IVP Q4H PRN


   PRN Reason: Pain, severe (8-10)


Aripiprazole [ Abilify] 2 Mg  (Home Med)  2 mg PO DAILY [facility]











Results





- Vital Signs


Recent Vital Signs: 


 Last Vital Signs











Temp  98.1 F   10/20/17 07:54


 


Pulse  62   10/20/17 07:54


 


Resp  16   10/20/17 07:54


 


BP  151/79 H  10/20/17 07:54


 


Pulse Ox  96   10/20/17 07:54














- Labs


Result Diagrams: 


 10/21/17 06:00





 10/21/17 06:00


Labs: 


 Laboratory Results - last 24 hr











  10/20/17 10/20/17 10/20/17





  07:46 12:05 16:23


 


POC Glucose (mg/dL)  118 H  93  88














Attending/Attestation





- Attestation


I have personally seen and examined this patient.: Yes


I have fully participated in the care of the patient.: Yes


I have reviewed all pertinent clinical information: Yes


Notes (Text): 


This is an addendum to GI progress report dictated by JUANPABLO Cox.The 

patient was seen and examined earlier.  Medical records, lab studies, imagings 

were reviewed.  Last 24 hours events reviewed.  Agreed with the above treatment 

plan as outlined in  JUANPABLO Cox's notes with the addition of the following 


Patient mainly complains of suprapubic and lower abdominal discomfort.


On examination patient has a tenderness suprapubically in the midline area.





On examination abdomen soft there is some mild tenderness in the suprapubic 

areaMRI of the pelvis done and also MRCP done.  MRCP was done to evaluate his 

small pancreatic cyst.  


Follow-up of the MRCP


I did discuss with the patient's family who were at bedside.


10/20/17 21:43

## 2017-10-20 NOTE — CP.PCM.HP
<Aliya Flowers - Last Filed: 10/20/17 11:46>





History of Present Illness





- History of Present Illness


History of Present Illness: 


PGY-2 h&p for Dr. Messina 





57 year old female with past medical history of HTN, diabetes type 2, 

hypothyriodism and depression presents with continued worsening diffuse 

abdominal pain, greatest in the RLQ. Patient speaks [place], family at bedside 

translated.  Patient states that the pain started 2 weeks ago. She describes 

the pain as burning and sarp loacted in her right lower quadrants. The pain is 

intermittent, no associated with food, activity of time of day. Patient states 

that the pain is worsening and lasting longer. She also reports subjective 

fevers, chills and body aches. She went to the ED earlier this month for 

similar symptoms. At that time she had CT abd which showed no acute 

abnormalities, fluid density pancreatic mass, and uterine fibroids. Patient was 

instructed to follow up with PCP. Patient states that her doctor sent her for 

test but the pain was to great and she could not wait. Patient was given 

prescription for pain medication but did not take it because she did not was to 

risk developing side effects. She denies n&v, diarrhea and constipation, chest 

pain or sob. Patient states she has been taking Tylenol for pain without 

improvement. denies urinary symptoms, dizziness or weakness. no other 

complaints.





past medical history: HTN, diabetes type 2, hypothyriodism and depression 


PSH: bladder sling, spinal repair, hernia dorsal spine repair, carpal tunnel 

repair


social history: denies smoking, alcohol use, illicit drug use 


family history: great grandmother ovarian ca


allergy: nkda


home meds: norvasc, metformin, lisinopril, glipizide, lexapro, abilify








Present on Admission





- Present on Admission


Any Indicators Present on Admission: No





Review of Systems





- Constitutional


Constitutional: absent: Fatigue, Fever, Headache





- EENT


Eyes: absent: Change in Vision


Nose/Mouth/Throat: absent: Nasal Congestion, Sore Throat





- Cardiovascular


Cardiovascular: absent: Chest Pain, Dyspnea, Palpitations





- Gastrointestinal


Gastrointestinal: Abdominal Pain.  absent: Constipation, Diarrhea, Nausea, 

Vomiting





- Genitourinary


Genitourinary: absent: Dysuria, Hematuria





- Musculoskeletal


Musculoskeletal: Myalgias.  absent: Arthralgias, Numbness, Stiffness, Tingling





- Integumentary


Integumentary: absent: Pruritus, Rash, Skin Ulcer, Sores





- Neurological


Neurological: absent: Dizziness, Numbness, Focal Weakness, Headaches, Weakness





- Hematologic/Lymphatic


Hematologic: absent: Easy Bleeding, Easy Bruising





Past Patient History





- Infectious Disease


Hx of Infectious Diseases: None





- Tetanus Immunizations


Tetanus Immunization: Unknown





- Past Social History


Smoking Status: Never Smoked





- CARDIAC


Hx Hypertension: Yes





- PULMONARY


Hx Respiratory Disorders: No





- NEUROLOGICAL


Hx Neurological Disorder: No





- HEENT


Hx HEENT Problems: No





- RENAL


Hx Chronic Kidney Disease: No





- ENDOCRINE/METABOLIC


Hx Diabetes Mellitus Type 2: Yes





- HEMATOLOGICAL/ONCOLOGICAL


Hx Blood Disorders: No





- INTEGUMENTARY


Hx Dermatological Problems: No





- MUSCULOSKELETAL/RHEUMATOLOGICAL


Hx Falls: No





- GASTROINTESTINAL


Hx Gastrointestinal Disorders: No





- GENITOURINARY/GYNECOLOGICAL


Hx Genitourinary Disorders: No





- PSYCHIATRIC


Hx Depression: Yes





- SURGICAL HISTORY


Hx Orthopedic Surgery: Yes (carpal tunnel, herniated disc)





- ANESTHESIA


Hx Anesthesia: Yes


Hx Anesthesia Reactions: No


Hx Malignant Hyperthermia: No





Meds


Allergies/Adverse Reactions: 


 Allergies











Allergy/AdvReac Type Severity Reaction Status Date / Time


 


No Known Allergies Allergy   Verified 10/19/17 14:47














Physical Exam





- Constitutional


Appears: No Acute Distress





- Head Exam


Head Exam: ATRAUMATIC, NORMAL INSPECTION, NORMOCEPHALIC





- Eye Exam


Eye Exam: EOMI, Normal appearance





- ENT Exam


ENT Exam: Mucous Membranes Moist





- Respiratory Exam


Respiratory Exam: Clear to Auscultation Bilateral, NORMAL BREATHING PATTERN.  

absent: Decreased Breath Sounds, Rhonchi, Wheezes, Respiratory Distress





- Cardiovascular Exam


Cardiovascular Exam: REGULAR RHYTHM, +S1, +S2.  absent: Tachycardia, Systolic 

Murmur





- GI/Abdominal Exam


GI & Abdominal Exam: Normal Bowel Sounds, Soft, Tenderness (lower quadrants).  

absent: Distended, Firm





- Extremities Exam


Extremities exam: Positive for: normal inspection.  Negative for: pedal edema, 

tenderness





- Back Exam


Back exam: NORMAL INSPECTION





- Neurological Exam


Neurological exam: Alert, Oriented x3





- Skin


Skin Exam: Dry, Intact, Normal Color, Warm





Results





- Vital Signs


Recent Vital Signs: 





 Last Vital Signs











Temp  98.1 F   10/20/17 07:54


 


Pulse  62   10/20/17 07:54


 


Resp  16   10/20/17 07:54


 


BP  151/79 H  10/20/17 07:54


 


Pulse Ox  96   10/20/17 07:54














- Labs


Result Diagrams: 


 10/19/17 14:40





 10/19/17 14:40


Labs: 





 Laboratory Results - last 24 hr











  10/20/17





  07:46


 


POC Glucose (mg/dL)  118 H














Assessment & Plan





- Assessment and Plan (Free Text)


Assessment: 


57 year old female with past medical histpry of HTN, diabetes type 2, 

hypothyriodism and depression presents with continued worsening diffuse 

abdominal pain, greatest in the RLQ.





Plan: 


1. abd pain 


- CT abd showed Fatty liver, no acute solid visceral abnormality; low 

attenuation/cystic pancreatic body lesion unchanged since the prior study; no 

bowel obstruction; no CT findings of appendicitis; fibroid uterus (see full 

reports) 


- pelvic US


- spine xray


- morphine for pain 


- GI consulted


- heme/onc consulted for pancreatic mass





2. HTN


- controlled


- continue home lisinopril





3. Diabetes type 1


- controlled


- cont glipizide


- ISS


- fingersticks achs





4. depression 


- continue home ablifiy and lexapro








<Stephon Messina S - Last Filed: 10/20/17 19:52>





Results





- Vital Signs


Recent Vital Signs: 





 Last Vital Signs











Temp  98.1 F   10/20/17 07:54


 


Pulse  62   10/20/17 07:54


 


Resp  16   10/20/17 07:54


 


BP  151/79 H  10/20/17 07:54


 


Pulse Ox  96   10/20/17 07:54














- Labs


Result Diagrams: 


 10/19/17 14:40





 10/19/17 14:40


Labs: 





 Laboratory Results - last 24 hr











  10/20/17 10/20/17 10/20/17





  07:46 12:05 16:23


 


POC Glucose (mg/dL)  118 H  93  88














Assessment & Plan





- Assessment and Plan (Free Text)


Plan: 





Pt is seen and examined. Note of medical resident reviewed and I am in agree 

with it. Reviewed Labs and medications. Reviewed notes. Spoke to Dr Peñaloza 

regarding Pancreatic cyst. Reviewed Ct report. Pt has DM-2. Pain is controlled.

## 2017-10-21 NOTE — MRI
PROCEDURE:  MRI pelvis without contrast



HISTORY:

suprapubic abdominal pain



COMPARISON:

None available.



TECHNIQUE:

Multiplanar, multi sequence MR images of the pelvis were obtained. No 

intravenous gadolinium contrast was administered. 



FINDINGS:



UTERUS:

Myometrium: Small hypo intense fibroids are seen in the uterine 

fundus measuring approximately 1.5 cm



Endometrium: Unremarkable.



Junctional zone: No abnormal thickening to indicate adenomyosis. No 

myometrial cysts.



Cervix: Unremarkable.



Vagina: Unremarkable.



Fibroids: None.



OVARIES/ ADNEXA:

Right ovary: Unremarkable.



Left ovary: Unremarkable.



Fallopian tubes: Not visualized. No evidence of hydrosalpinx.



BOWEL:

Partially visualized rectosigmoid colon is grossly unremarkable.



LYMPH NODES:

No lymphadenopathy.



BLADDER:

Unremarkable.



FREE FLUID:

None.



PELVIC BONES:

Grossly unremarkable.



OTHER FINDINGS:

None.



IMPRESSION:

Unremarkable non-contrast enhanced MRI of the pelvis.

## 2017-10-21 NOTE — MRI
PROCEDURE:  MRI Abdomen with and without contrast plus MRCP imaging 



HISTORY:

Pancreatic mass evaluation



COMPARISON:

None available.



TECHNIQUE:

Multisequence, multiplanar MR images of the abdomen with and without 

gadolinium contrast enhancement. 20 cc of Omniscan were injected 



FINDINGS:



LIVER:

 Unremarkable.



GALLBLADDER:

Unremarkable.



SPLEEN:

Unremarkable.



PANCREAS:

A small 5.8 mm cyst is seen at the junction of the pancreatic head 

and body. There is no abnormal enhancement.  There is no enlargement 

of the pancreatic duct.



ACR white paper guidelines suggest a single follow-up abdominal MRI 

in 1 year.  This does not need to be performed with IV contrast.



ADRENALS:

Unremarkable.



KIDNEYS:

Unremarkable.



AORTA:

No aneurysm.



ASCITES:

 None.



PERITONEUM:

Unremarkable.



LYMPH NODES:

 Unremarkable.



OTHER FINDINGS:

No evidence of abnormal enhancement



IMPRESSION:

A small 5.8 mm cyst is seen at the junction of the pancreatic head 

and body. There is no abnormal enhancement.  There is no enlargement 

of the pancreatic duct.   ACR white paper guidelines suggest a single 

follow-up abdominal MRI in 1 year.  This does not need to be 

performed with IV contrast.

## 2017-10-21 NOTE — CP.PCM.PN
Subjective





- Date & Time of Evaluation


Date of Evaluation: 10/21/17


Time of Evaluation: 15:00





- Subjective


Subjective: 


this patient was seen and evaluated the area.  Patient's daughter and also has 

been where at bedside.  Patient feels better tolerating the diet.  MRI scans 

are reviewed








Objective





- Vital Signs/Intake and Output


Vital Signs (last 24 hours): 


 











Temp Pulse Resp BP Pulse Ox


 


 98.7 F   66   20   119/76   95 


 


 10/21/17 16:24  10/21/17 16:24  10/21/17 16:24  10/21/17 16:24  10/21/17 16:24








Intake and Output: 


 











 10/21/17 10/22/17





 18:59 06:59


 


Intake Total 860 


 


Balance 860 














- Labs


Labs: 


 





 10/21/17 06:00 





 10/21/17 06:00 











- Constitutional


Appears: Well, No Acute Distress





- Head Exam


Head Exam: NORMAL INSPECTION.  absent: ATRAUMATIC, NORMOCEPHALIC





- Eye Exam


Eye Exam: EOMI, PERRL





- ENT Exam


ENT Exam: Mucous Membranes Moist, Normal Exam





- Neck Exam


Neck Exam: Full ROM, Normal Inspection





- Respiratory Exam


Respiratory Exam: Clear to Ausculation Bilateral, NORMAL BREATHING PATTERN





- Cardiovascular Exam


Cardiovascular Exam: REGULAR RHYTHM, +S1, +S2.  absent: JVD





- GI/Abdominal Exam


GI & Abdominal Exam: Soft, Organomegaly.  absent: Tenderness


Additional comments: 





mild tenderness in the suprapubic area.





- Rectal Exam


Rectal Exam: NORMAL INSPECTION





- Extremities Exam


Extremities Exam: Full ROM.  absent: Calf Tenderness





- Back Exam


Additional comments: 





SLR restricted on the right side.  Patient also has some tenderness over the 

lumbosacral area





- Neurological Exam


Neurological Exam: Alert, Awake, Oriented x3





- Skin


Skin Exam: Intact, Normal Color





Assessment and Plan





- Assessment and Plan (Free Text)


Assessment: 





1.  Suprapubic pain or abdominal pain etiology unclear.  MRI scans reviewed.


2.  Small pancreatic cystic lesion


Plan


1.  EUS evaluation 


2. GYN evaluation


3.  Patient will need elective colonoscopy


Discussed with the patient's family and patient at length who fully understood 

the importance of gi FOLLOW-UP RECOMMENDATIONS


discussed with Dr Galeano

## 2017-10-22 NOTE — PN
DATE:  10/21/2017



SUBJECTIVE:  The patient has no complaints of any chest pain.  No shortness

of breath.  No headache or dizziness.  She was initially admitted because

of abdominal pain.  She has been having suprapubic pain.  She had a pelvic

MRI that was done as well as pelvic ultrasound.  She was unremarkable with

the MRI.  The ultrasound showed several small calcified fibroid.  She was

seen by GI.  She was advised to follow up with GYN.  We spoke to the

patient's family at the bedside _____ update on the patient's diagnosis and

plan of care.



PHYSICAL EXAMINATION:

VITAL SIGNS:  Temperature 98.6, pulse of 61, blood pressure is 114/74,

respirations 20, and O2 saturation 96%.

GENERAL:  The patient is lying in bed, flat, comfortable.

HEENT:  No oral lesion.  Anicteric sclerae.  Moist mucosa.

NECK:  No JVD, adenopathy, or thyromegaly.

CARDIOVASCULAR:  S1 and S2, regular.  No murmurs, rubs, or gallops.

LUNGS:  Clear to auscultation bilaterally.  No wheeze, rales, or rhonchi.

ABDOMEN:  Bowel sounds are positive, soft, nontender and nondistended.

EXTREMITIES:  No cyanosis, clubbing or edema.



ASSESSMENT:

1.  Abdominal pain, resolved.

2.  Diabetes type 2.

3.  Hypertension.

4.  Depression.



PLAN:  The patient is currently comfortable.  She is going to continue her

Lexapro for her depression.  She is on morphine for pain as needed.  She is

on Norvasc for hypertension.  She is on lisinopril.  The patient is on

carbohydrate consistent diet.  Condition is stable.  Activities increase as

tolerated.





__________________________________________

Stephon Messina MD





DD:  10/21/2017 16:01:34

DT:  10/22/2017 0:43:03

Job # [id number]

## 2019-02-08 ENCOUNTER — HOSPITAL ENCOUNTER (OUTPATIENT)
Dept: HOSPITAL 42 - RAD | Age: 59
End: 2019-02-08
Payer: MEDICARE